# Patient Record
Sex: FEMALE | Race: WHITE | NOT HISPANIC OR LATINO | Employment: PART TIME | URBAN - METROPOLITAN AREA
[De-identification: names, ages, dates, MRNs, and addresses within clinical notes are randomized per-mention and may not be internally consistent; named-entity substitution may affect disease eponyms.]

---

## 2019-05-20 ENCOUNTER — HOSPITAL ENCOUNTER (OUTPATIENT)
Dept: OTHER | Facility: HOSPITAL | Age: 69
Discharge: HOME | End: 2019-05-20
Attending: FAMILY MEDICINE | Admitting: FAMILY MEDICINE

## 2022-02-02 ENCOUNTER — OFFICE VISIT (OUTPATIENT)
Dept: FAMILY MEDICINE CLINIC | Facility: CLINIC | Age: 72
End: 2022-02-02

## 2022-02-02 ENCOUNTER — TELEPHONE (OUTPATIENT)
Dept: FAMILY MEDICINE CLINIC | Facility: CLINIC | Age: 72
End: 2022-02-02

## 2022-02-02 VITALS
RESPIRATION RATE: 16 BRPM | HEART RATE: 82 BPM | WEIGHT: 119.8 LBS | OXYGEN SATURATION: 100 % | DIASTOLIC BLOOD PRESSURE: 70 MMHG | SYSTOLIC BLOOD PRESSURE: 128 MMHG | TEMPERATURE: 97.8 F | BODY MASS INDEX: 22.05 KG/M2 | HEIGHT: 62 IN

## 2022-02-02 DIAGNOSIS — Z53.20 SCREENING DECLINED BY PATIENT: ICD-10-CM

## 2022-02-02 DIAGNOSIS — Z12.11 COLON CANCER SCREENING: ICD-10-CM

## 2022-02-02 DIAGNOSIS — Z28.21 IMMUNIZATION DECLINED: ICD-10-CM

## 2022-02-02 DIAGNOSIS — Z12.4 SCREENING FOR CERVICAL CANCER: ICD-10-CM

## 2022-02-02 DIAGNOSIS — Z12.31 ENCOUNTER FOR SCREENING MAMMOGRAM FOR MALIGNANT NEOPLASM OF BREAST: ICD-10-CM

## 2022-02-02 DIAGNOSIS — Z00.00 MEDICARE ANNUAL WELLNESS VISIT, SUBSEQUENT: Primary | ICD-10-CM

## 2022-02-02 LAB
BILIRUB BLD-MCNC: NEGATIVE MG/DL
CLARITY, POC: CLEAR
COLOR UR: YELLOW
DEVELOPER EXPIRATION DATE: NORMAL
DEVELOPER LOT NUMBER: NORMAL
EXPIRATION DATE: NORMAL
FECAL OCCULT BLOOD SCREEN, POC: NEGATIVE
GLUCOSE UR STRIP-MCNC: NEGATIVE MG/DL
KETONES UR QL: NEGATIVE
LEUKOCYTE EST, POC: NEGATIVE
Lab: NORMAL
NEGATIVE CONTROL: NEGATIVE
NITRITE UR-MCNC: NEGATIVE MG/ML
PH UR: 8 [PH] (ref 5–8)
POSITIVE CONTROL: POSITIVE
PROT UR STRIP-MCNC: ABNORMAL MG/DL
RBC # UR STRIP: NEGATIVE /UL
SP GR UR: 1.01 (ref 1–1.03)
UROBILINOGEN UR QL: NORMAL

## 2022-02-02 PROCEDURE — 3015F CERV CANCER SCREEN DOCD: CPT | Performed by: FAMILY MEDICINE

## 2022-02-02 PROCEDURE — 1159F MED LIST DOCD IN RCRD: CPT | Performed by: FAMILY MEDICINE

## 2022-02-02 PROCEDURE — 99397 PER PM REEVAL EST PAT 65+ YR: CPT | Performed by: FAMILY MEDICINE

## 2022-02-02 PROCEDURE — G0439 PPPS, SUBSEQ VISIT: HCPCS | Performed by: FAMILY MEDICINE

## 2022-02-02 PROCEDURE — 81002 URINALYSIS NONAUTO W/O SCOPE: CPT | Performed by: FAMILY MEDICINE

## 2022-02-02 PROCEDURE — 82270 OCCULT BLOOD FECES: CPT | Performed by: FAMILY MEDICINE

## 2022-02-02 RX ORDER — MULTIPLE VITAMINS W/ MINERALS TAB 9MG-400MCG
1 TAB ORAL DAILY
COMMUNITY

## 2022-02-02 NOTE — PROGRESS NOTES
"The ABCs of the Annual Wellness Visit  Subsequent Medicare Wellness Visit    Chief Complaint   Patient presents with   • Medicare Wellness-subsequent      Subjective    History of Present Illness:  Lisbeth Villegas is a 71 y.o. female who presents for a Subsequent Medicare Wellness Visit.    The following portions of the patient's history were reviewed and updated as appropriate: allergies, current medications, past family history, past medical history, past social history, past surgical history and problem list.    Recent Hospitalizations:  She was not admitted to the hospital during the last year.     Current Medical Providers:  Patient Care Team:  Jumana Torres MD as PCP - General  Jumana Torres MD as PCP - Family Medicine    Outpatient Medications Prior to Visit   Medication Sig Dispense Refill   • multivitamin with minerals (CENTRUM SILVER 50+WOMEN PO) Take 1 tablet by mouth Daily.       No facility-administered medications prior to visit.       No opioid medication identified on active medication list. I have reviewed chart for other potential  high risk medication/s and harmful drug interactions in the elderly.        Aspirin is not on active medication list.  Aspirin use is not indicated based on review of current medical condition/s. Risk of harm outweighs potential benefits.  .    There is no problem list on file for this patient.    Advance Care Planning  Advance Directive is not on file.  ACP discussion was held with the patient during this visit. Patient does not have an advance directive, information provided.          Objective    Vitals:    02/02/22 1105   BP: 128/70   BP Location: Right arm   Patient Position: Sitting   Cuff Size: Adult   Pulse: 82   Resp: 16   Temp: 97.8 °F (36.6 °C)   TempSrc: Temporal   SpO2: 100%  Comment: Room air   Weight: 54.3 kg (119 lb 12.8 oz)   Height: 157.5 cm (62\")     BMI Readings from Last 1 Encounters:   02/02/22 21.91 kg/m²   BMI is below " normal parameters. Recommendations include: none (medical contraindication)  Body mass index is 21.91 kg/m².  BMI has not been calculated during today's encounter.     Does the patient have evidence of cognitive impairment? No    Physical Exam  Exam conducted with a chaperone present.   Constitutional:       General: She is not in acute distress.     Appearance: Normal appearance. She is well-developed. She is not ill-appearing.   HENT:      Head: Normocephalic and atraumatic.      Right Ear: Tympanic membrane, ear canal and external ear normal.      Left Ear: Tympanic membrane, ear canal and external ear normal.      Nose: Nose normal.      Mouth/Throat:      Mouth: Mucous membranes are moist.      Pharynx: No posterior oropharyngeal erythema.   Eyes:      General: No scleral icterus.        Right eye: No discharge.         Left eye: No discharge.      Extraocular Movements: Extraocular movements intact.      Conjunctiva/sclera: Conjunctivae normal.      Pupils: Pupils are equal, round, and reactive to light.   Neck:      Thyroid: No thyromegaly.      Vascular: No carotid bruit or JVD.   Cardiovascular:      Rate and Rhythm: Normal rate and regular rhythm.      Pulses: Normal pulses.      Heart sounds: Normal heart sounds. No murmur heard.      Pulmonary:      Effort: Pulmonary effort is normal.      Breath sounds: Normal breath sounds.   Chest:   Breasts: Breasts are symmetrical.      Right: No mass, nipple discharge, skin change or tenderness.      Left: No mass, nipple discharge, skin change or tenderness.       Abdominal:      General: Bowel sounds are normal.      Palpations: Abdomen is soft.      Tenderness: There is no abdominal tenderness. There is no guarding or rebound.   Genitourinary:     Exam position: Supine.      Labia:         Right: No rash.         Left: No rash.       Vagina: Normal. No vaginal discharge.      Cervix: No discharge, lesion or cervical bleeding.      Uterus: Not tender.        Adnexa:         Right: No mass or tenderness.          Left: No mass or tenderness.        Rectum: Normal. Guaiac result negative.   Musculoskeletal:      Cervical back: Normal range of motion and neck supple. No edema.      Right lower leg: No edema.      Left lower leg: No edema.   Lymphadenopathy:      Cervical: No cervical adenopathy.   Skin:     General: Skin is warm and dry.      Capillary Refill: Capillary refill takes less than 2 seconds.      Findings: No rash.   Neurological:      General: No focal deficit present.      Mental Status: She is alert and oriented to person, place, and time. Mental status is at baseline.      Cranial Nerves: No cranial nerve deficit.      Coordination: Coordination normal.      Gait: Gait normal.      Deep Tendon Reflexes: Reflexes normal.   Psychiatric:         Mood and Affect: Mood normal.         Behavior: Behavior normal.         Thought Content: Thought content normal.                 HEALTH RISK ASSESSMENT    Smoking Status:  Social History     Tobacco Use   Smoking Status Never Smoker   Smokeless Tobacco Never Used     Alcohol Consumption:  Social History     Substance and Sexual Activity   Alcohol Use Never     Fall Risk Screen:    formerly [facility] Fall Risk Assessment was completed, and patient is at LOW risk for falls.Assessment completed on:2/2/2022    Depression Screening:  PHQ-2/PHQ-9 Depression Screening 2/2/2022   Little interest or pleasure in doing things 0   Feeling down, depressed, or hopeless 0   Trouble falling or staying asleep, or sleeping too much 0   Feeling tired or having little energy 0   Poor appetite or overeating 0   Feeling bad about yourself - or that you are a failure or have let yourself or your family down 0   Trouble concentrating on things, such as reading the newspaper or watching television 0   Moving or speaking so slowly that other people could have noticed. Or the opposite - being so fidgety or restless that you have been moving around a lot  more than usual 0   Thoughts that you would be better off dead, or of hurting yourself in some way 0   Total Score 0   If you checked off any problems, how difficult have these problems made it for you to do your work, take care of things at home, or get along with other people? Not difficult at all       Health Habits and Functional and Cognitive Screening:  Functional & Cognitive Status 2/2/2022   Do you have difficulty preparing food and eating? No   Do you have difficulty bathing yourself, getting dressed or grooming yourself? No   Do you have difficulty using the toilet? No   Do you have difficulty moving around from place to place? No   Do you have trouble with steps or getting out of a bed or a chair? No   Current Diet Well Balanced Diet   Dental Exam Not up to date        Dental Exam Comment 2-3 years ago   Eye Exam Not up to date        Eye Exam Comment 4 years ago   Exercise (times per week) 0 times per week   Current Exercises Include No Regular Exercise   Do you need help using the phone?  No   Are you deaf or do you have serious difficulty hearing?  No   Do you need help with transportation? No   Do you need help shopping? No   Do you need help preparing meals?  No   Do you need help with housework?  No   Do you need help with laundry? No   Do you need help taking your medications? No   Do you need help managing money? No   Do you ever drive or ride in a car without wearing a seat belt? No   Have you felt unusual stress, anger or loneliness in the last month? No   Who do you live with? Spouse   If you need help, do you have trouble finding someone available to you? No   Have you been bothered in the last four weeks by sexual problems? No   Do you have difficulty concentrating, remembering or making decisions? No       Age-appropriate Screening Schedule:  Refer to the list below for future screening recommendations based on patient's age, sex and/or medical conditions. Orders for these recommended tests  are listed in the plan section. The patient has been provided with a written plan.    Health Maintenance   Topic Date Due   • INFLUENZA VACCINE  03/31/2022 (Originally 8/1/2021)   • DXA SCAN  02/02/2023 (Originally 1950)   • TDAP/TD VACCINES (1 - Tdap) 02/02/2023 (Originally 4/9/1969)   • ZOSTER VACCINE (1 of 2) 02/02/2023 (Originally 4/9/2001)   • MAMMOGRAM  02/09/2024                POCT urinalysis dipstick, manual    Collection Time: 02/02/22 11:21 AM    Specimen: Urine   Result Value Ref Range    Color Yellow Yellow, Straw, Dark Yellow, [place]    Clarity, UA Clear Clear    Glucose, UA Negative Negative, 1000 mg/dL (3+) mg/dL    Bilirubin Negative Negative    Ketones, UA Negative Negative    Specific Gravity  1.010 1.005 - 1.030    Blood, UA Negative Negative    pH, Urine 8.0 5.0 - 8.0    Protein, POC Trace (A) Negative mg/dL    Urobilinogen, UA Normal Normal    Leukocytes Negative Negative    Nitrite, UA Negative Negative   POC Occult Blood Stool    Collection Time: 02/02/22 12:00 PM    Specimen: Stool   Result Value Ref Range    Fecal Occult Blood Negative Negative         Assessment/Plan   CMS Preventative Services Quick Reference  Risk Factors Identified During Encounter  Cardiovascular Disease - declines screening  Chronic Pain - no concerns today  Dementia/Memory - screening negative  Depression/Dysphoria- screening negative  Hearing Problem - no concerns today  Immunizations Discussed/Encouraged (specific Immunizations; many vaccinations due - patient declines vaccination)  Inadequate Social Support, Isolation, Loneliness, Lack of Transportation, Financial Difficulties, or Caregiver Stress - no issues brought up by patient today.    The above risks/problems have been discussed with the patient.  Follow up actions/plans if indicated are seen below in the Assessment/Plan Section.  Pertinent information has been shared with the patient in the After Visit Summary.    Diagnoses and all orders for this  visit:    1. Medicare annual wellness visit, subsequent (Primary)  -     POCT urinalysis dipstick, manual    2. Screening for cervical cancer  -     IGP,Aptima HPV,Age Gdln  -     Pap IG (Image Guided)    3. Encounter for screening mammogram for malignant neoplasm of breast  -     Mammo Screening Digital Tomosynthesis Bilateral With CAD    4. Colon cancer screening  -     POC Occult Blood Stool    5. Immunization declined  Comments:  Multiple.    6. Screening declined by patient  Comments:  Colon cancer screening - hemoccult done as part of exam today and negative.  Declines hep C and lipid screening exams.    Patient only desiring pap smear and mammogram.  Declines other preventative services.    Follow Up:   Return if symptoms worsen or fail to improve.     An After Visit Summary and PPPS were made available to the patient.         I wore protective equipment throughout this patient encounter to include mask. Hand hygiene was performed before donning protective equipment and after removal when leaving the room.

## 2022-02-04 LAB
AGE GDLN ACOG TESTING: NORMAL
CYTOLOGIST CVX/VAG CYTO: NORMAL
CYTOLOGY CVX/VAG DOC CYTO: NORMAL
CYTOLOGY CVX/VAG DOC THIN PREP: NORMAL
DX ICD CODE: NORMAL
HIV 1 & 2 AB SER-IMP: NORMAL
OTHER STN SPEC: NORMAL
STAT OF ADQ CVX/VAG CYTO-IMP: NORMAL

## 2022-02-09 ENCOUNTER — HOSPITAL ENCOUNTER (OUTPATIENT)
Dept: MAMMOGRAPHY | Facility: HOSPITAL | Age: 72
Discharge: HOME | End: 2022-02-09
Admitting: FAMILY MEDICINE

## 2022-02-09 PROCEDURE — 77067 SCR MAMMO BI INCL CAD: CPT

## 2022-02-09 PROCEDURE — 77063 BREAST TOMOSYNTHESIS BI: CPT

## 2023-07-31 ENCOUNTER — OFFICE VISIT (OUTPATIENT)
Dept: FAMILY MEDICINE CLINIC | Facility: CLINIC | Age: 73
End: 2023-07-31
Payer: MEDICARE

## 2023-07-31 VITALS
HEART RATE: 82 BPM | OXYGEN SATURATION: 100 % | SYSTOLIC BLOOD PRESSURE: 126 MMHG | DIASTOLIC BLOOD PRESSURE: 86 MMHG | WEIGHT: 115 LBS | HEIGHT: 62 IN | RESPIRATION RATE: 20 BRPM | BODY MASS INDEX: 21.16 KG/M2

## 2023-07-31 DIAGNOSIS — Z13.220 SCREENING FOR LIPID DISORDERS: ICD-10-CM

## 2023-07-31 DIAGNOSIS — Z00.00 MEDICARE ANNUAL WELLNESS VISIT, SUBSEQUENT: Primary | ICD-10-CM

## 2023-07-31 DIAGNOSIS — N95.9 POST MENOPAUSAL PROBLEMS: ICD-10-CM

## 2023-07-31 DIAGNOSIS — E78.5 HYPERLIPIDEMIA, UNSPECIFIED HYPERLIPIDEMIA TYPE: ICD-10-CM

## 2023-07-31 DIAGNOSIS — Z12.31 ENCOUNTER FOR SCREENING MAMMOGRAM FOR MALIGNANT NEOPLASM OF BREAST: ICD-10-CM

## 2023-07-31 DIAGNOSIS — R53.83 MALAISE AND FATIGUE: ICD-10-CM

## 2023-07-31 DIAGNOSIS — R53.81 MALAISE AND FATIGUE: ICD-10-CM

## 2023-07-31 NOTE — PROGRESS NOTES
Subjective   Lisbeth Villegas is a 73 y.o. female.     Chief Complaint   Patient presents with    Medicare Wellness-subsequent       The patient is here: for coordination of medical care to discuss health maintenance and disease prevention.  Last comprehensive physical was on 2/2/2022.  Previous physical was performed by  Shane Younger MD  Overall has: vigorous activity with work/home activities, exercises 2 - 3 times per week, good appetite, feels well with no complaints, good energy level, and is sleeping well. Nutrition: appropriate balanced diet and eating a variety of foods. Last tetanus shot was unknown.   Last Completed Mammogram            MAMMOGRAM (Every 2 Years) Next due on 2/9/2024 02/09/2022  Mammo Screening Digital Tomosynthesis Bilateral With CAD    12/16/2014  SCANNED - MAMMO    08/24/2012  SCANNED - MAMMO    07/30/2010  SCANNED - MAMMO    08/22/2008  SCANNED - MAMMO                    History of Present Illness     Recent Hospitalizations:  No hospitalization(s) within the last year..  ccc    I personally reviewed and updated the patient's allergies, medications, problem list, and past medical, surgical, social, and family history. I have reviewed and confirmed the accuracy of the HPI and ROS as documented by the MA/LPN/RN Sofia Lewis MA      Family History   Problem Relation Age of Onset    Stroke Mother 80    Stroke Father 80    Heart attack Sister     Prostate cancer Brother        Social History     Tobacco Use    Smoking status: Never     Passive exposure: Never    Smokeless tobacco: Never   Vaping Use    Vaping Use: Never used   Substance Use Topics    Alcohol use: Never    Drug use: Never       Past Surgical History:   Procedure Laterality Date    CATARACT EXTRACTION, BILATERAL Bilateral 11/01/2022    Dr. Russell    KNEE ARTHROSCOPY Left 01/01/2017    LAPAROSCOPIC TUBAL LIGATION         Patient Active Problem List   Diagnosis    Medicare annual wellness visit, subsequent    Encounter for  "screening mammogram for malignant neoplasm of breast    Post menopausal problems         Current Outpatient Medications:     multivitamin with minerals tablet tablet, Take 1 tablet by mouth Daily. (Patient not taking: Reported on 2023), Disp: , Rfl:       Objective   /86 (BP Location: Right arm, Patient Position: Sitting, Cuff Size: Adult)   Pulse 82   Resp 20   Ht 157.5 cm (62.01\")   Wt 52.2 kg (115 lb)   SpO2 100%   BMI 21.03 kg/mý   BP Readings from Last 3 Encounters:   23 126/86   22 128/70   19 126/89     Wt Readings from Last 3 Encounters:   23 52.2 kg (115 lb)   22 54.3 kg (119 lb 12.8 oz)   19 50.9 kg (112 lb 4 oz)         Age-appropriate Screening Schedule:  Refer to the list below for future screening recommendations based on patient's age, sex and/or medical conditions. Orders for these Rosa Damien tests are listed in the plan section. The patient has been provided with a written plan.    Health Maintenance   Topic Date Due    DXA SCAN  Never done    COVID-19 Vaccine (1) Never done    TDAP/TD VACCINES (1 - Tdap) Never done    ZOSTER VACCINE (1 of 2) Never done    Pneumococcal Vaccine 65+ (1 - PCV) Never done    HEPATITIS C SCREENING  Never done    COLORECTAL CANCER SCREENING  2023    INFLUENZA VACCINE  10/01/2023    MAMMOGRAM  2024    ANNUAL WELLNESS VISIT  2024       Depression Screen:       2023     1:07 PM   PHQ-2/PHQ-9 Depression Screening   Little Interest or Pleasure in Doing Things 0-->not at all   Feeling Down, Depressed or Hopeless 0-->not at all   PHQ-9: Brief Depression Severity Measure Score 0     I spent more than 16 minutes asking patient questions, counseling and documenting in the chart.    Health Habits and Functional and Cognitive Screenin/31/2023     1:07 PM   Functional & Cognitive Status   Do you have difficulty preparing food and eating? No   Do you have difficulty bathing yourself, getting dressed " or grooming yourself? No   Do you have difficulty using the toilet? No   Do you have difficulty moving around from place to place? No   Do you have trouble with steps or getting out of a bed or a chair? No   Current Diet Well Balanced Diet   Dental Exam Up to date   Eye Exam Up to date   Exercise (times per week) 8 times per week   Current Exercises Include Yard Work;Walking   Do you need help using the phone?  No   Are you deaf or do you have serious difficulty hearing?  No   Do you need help to go to places out of walking distance? No   Do you need help shopping? No   Do you need help preparing meals?  No   Do you need help with housework?  No   Do you need help with laundry? No   Do you need help taking your medications? No   Do you need help managing money? No   Do you ever drive or ride in a car without wearing a seat belt? No   Have you felt unusual stress, anger or loneliness in the last month? No   Who do you live with? Spouse   If you need help, do you have trouble finding someone available to you? No   Have you been bothered in the last four weeks by sexual problems? No   Do you have difficulty concentrating, remembering or making decisions? No       Does the patient have evidence of cognitive impairment? No    Advance Care Planning:  ACP discussion was held with the patient during this visit. Patient does not have an advance directive, declines further assistance.     A face-to-face visit was completed today with patient.  Counseling explanation, and discussion of advanced directives was performed.   The last advanced care visit was performed in 2022.  In a near life ending situation, from which she is not expected to recover functionally, and she is not able to speak for her, she does not want life sustaining measures. We discussed feeding tubes, ventilators and cardiac support as well as dialysis.    I spent more than 16 minutes discussing Advanced Care Planning information and documenting in the  chart.    Identification of Risk Factors:  Risk factors include: Advance Directive Discussion  Breast Cancer/Mammogram Screening  Cardiovascular risk  Colon Cancer Screening  Fall Risk  Immunizations Discussed/Encouraged (specific immunizations; Tdap, Prevnar 20 (Pneumococcal 20-valent conjugate), Shingrix, and COVID19 )  Osteoporosis Risk.    Compared to one year ago, the patient feels her physical health is the same.  Compared to one year ago, the patient feels her mental health is the same.    Patient Self-Management and Personalized Health Advice  The patient has been provided with information about: diet, exercise, weight management, prevention of cardiac or vascular disease, fall prevention, and designing advance directives and preventive services including:   Alcohol Misuse Screening and Counseling  (15 minutes counseling time, Code )  Annual Wellness Visit (AWV)  Bone Density Measurements  Cardiovascular Disease Screening Tests (may do this order every 5 years in beneficiaries without signs or symptoms of cardiovascular disease)  Colorectal Cancer Screening, Colonoscopy.      Assessment & Plan      Medications        Problem List         LOS    Medicare wellness.  Overall doing well.  Recommend update pneumonia, tetanus vaccine she declines.  Discussed calcium vitamin D, coated baby aspirin daily.  Check screening blood work.  Discussed health maintenance, screening test, lifestyle modification.  DEXA scan declined.  Breast cancer screening.  Recommend mammogram she declines.  Family history CVA.  Both parents.  Start coated baby aspirin daily.  Recommend carotid Dopplers times.  Colon cancer screening.  Recommend Cologuard/colonoscopy she declines.      Diagnoses and all orders for this visit:    1. Medicare annual wellness visit, subsequent (Primary)    2. Encounter for screening mammogram for malignant neoplasm of breast    3. Post menopausal problems        Medicare wellness.  Discussed health  maintenance, routine immunizations, screening tests, lifestyle modification.

## 2023-08-01 LAB
ALBUMIN SERPL-MCNC: 4.8 G/DL (ref 3.8–4.8)
ALBUMIN/GLOB SERPL: 1.7 {RATIO} (ref 1.2–2.2)
ALP SERPL-CCNC: 74 IU/L (ref 44–121)
ALT SERPL-CCNC: 16 IU/L (ref 0–32)
AST SERPL-CCNC: 33 IU/L (ref 0–40)
BASOPHILS # BLD AUTO: 0 X10E3/UL (ref 0–0.2)
BASOPHILS NFR BLD AUTO: 0 %
BILIRUB SERPL-MCNC: 1 MG/DL (ref 0–1.2)
BUN SERPL-MCNC: 17 MG/DL (ref 8–27)
BUN/CREAT SERPL: 20 (ref 12–28)
CALCIUM SERPL-MCNC: 9.5 MG/DL (ref 8.7–10.3)
CHLORIDE SERPL-SCNC: 103 MMOL/L (ref 96–106)
CHOLEST SERPL-MCNC: 255 MG/DL (ref 100–199)
CHOLEST/HDLC SERPL: 2.9 RATIO (ref 0–4.4)
CO2 SERPL-SCNC: 21 MMOL/L (ref 20–29)
CREAT SERPL-MCNC: 0.85 MG/DL (ref 0.57–1)
EGFRCR SERPLBLD CKD-EPI 2021: 73 ML/MIN/1.73
EOSINOPHIL # BLD AUTO: 0 X10E3/UL (ref 0–0.4)
EOSINOPHIL NFR BLD AUTO: 1 %
ERYTHROCYTE [DISTWIDTH] IN BLOOD BY AUTOMATED COUNT: 13.9 % (ref 11.7–15.4)
GLOBULIN SER CALC-MCNC: 2.8 G/DL (ref 1.5–4.5)
GLUCOSE SERPL-MCNC: 83 MG/DL (ref 70–99)
HCT VFR BLD AUTO: 42.5 % (ref 34–46.6)
HDLC SERPL-MCNC: 87 MG/DL
HGB BLD-MCNC: 14.2 G/DL (ref 11.1–15.9)
IMM GRANULOCYTES # BLD AUTO: 0 X10E3/UL (ref 0–0.1)
IMM GRANULOCYTES NFR BLD AUTO: 0 %
LDLC SERPL CALC-MCNC: 150 MG/DL (ref 0–99)
LYMPHOCYTES # BLD AUTO: 2.2 X10E3/UL (ref 0.7–3.1)
LYMPHOCYTES NFR BLD AUTO: 36 %
MCH RBC QN AUTO: 30.3 PG (ref 26.6–33)
MCHC RBC AUTO-ENTMCNC: 33.4 G/DL (ref 31.5–35.7)
MCV RBC AUTO: 91 FL (ref 79–97)
MONOCYTES # BLD AUTO: 0.3 X10E3/UL (ref 0.1–0.9)
MONOCYTES NFR BLD AUTO: 6 %
NEUTROPHILS # BLD AUTO: 3.5 X10E3/UL (ref 1.4–7)
NEUTROPHILS NFR BLD AUTO: 57 %
PLATELET # BLD AUTO: 246 X10E3/UL (ref 150–450)
POTASSIUM SERPL-SCNC: 5 MMOL/L (ref 3.5–5.2)
PROT SERPL-MCNC: 7.6 G/DL (ref 6–8.5)
RBC # BLD AUTO: 4.69 X10E6/UL (ref 3.77–5.28)
SODIUM SERPL-SCNC: 142 MMOL/L (ref 134–144)
TRIGL SERPL-MCNC: 107 MG/DL (ref 0–149)
TSH SERPL DL<=0.005 MIU/L-ACNC: 7.65 UIU/ML (ref 0.45–4.5)
VLDLC SERPL CALC-MCNC: 18 MG/DL (ref 5–40)
WBC # BLD AUTO: 6 X10E3/UL (ref 3.4–10.8)

## 2023-08-25 PROBLEM — E78.5 HYPERLIPIDEMIA: Status: ACTIVE | Noted: 2023-08-25

## 2023-08-25 NOTE — PROGRESS NOTES
Subjective   Lisbeth Villegas is a 73 y.o. female.     Chief Complaint   Patient presents with    Medicare Wellness-subsequent    Hyperlipidemia       Hyperlipidemia  This is a chronic problem. The current episode started more than 1 year ago. Pertinent negatives include no chest pain, leg pain or shortness of breath. Current antihyperlipidemic treatment includes diet change and exercise. Risk factors for coronary artery disease include dyslipidemia.          I personally reviewed and updated the patient's allergies, medications, problem list, and past medical, surgical, social, and family history. I have reviewed and confirmed the accuracy of the History of Present Illness and Review of Symptoms as documented by the MA/LPN/RN. Shane Younger MD    Family History   Problem Relation Age of Onset    Stroke Mother 80    Stroke Father 80    Heart attack Sister     Prostate cancer Brother        Social History     Tobacco Use    Smoking status: Never     Passive exposure: Never    Smokeless tobacco: Never   Vaping Use    Vaping Use: Never used   Substance Use Topics    Alcohol use: Never    Drug use: Never       Past Surgical History:   Procedure Laterality Date    CATARACT EXTRACTION, BILATERAL Bilateral 11/01/2022    Dr. Russell    KNEE ARTHROSCOPY Left 01/01/2017    LAPAROSCOPIC TUBAL LIGATION         Patient Active Problem List   Diagnosis    Medicare annual wellness visit, subsequent    Encounter for screening mammogram for malignant neoplasm of breast    Post menopausal problems    Hyperlipidemia         Current Outpatient Medications:     multivitamin with minerals tablet tablet, Take 1 tablet by mouth Daily. (Patient not taking: Reported on 7/31/2023), Disp: , Rfl:          Review of Systems   Constitutional:  Negative for chills and diaphoresis.   HENT:  Negative for trouble swallowing and voice change.    Eyes:  Negative for visual disturbance.   Respiratory:  Negative for shortness of breath.   "  Cardiovascular:  Negative for chest pain and palpitations.   Gastrointestinal:  Negative for abdominal pain and nausea.   Endocrine: Negative for polydipsia and polyphagia.   Genitourinary:  Negative for hematuria.   Musculoskeletal:  Negative for neck stiffness.   Skin:  Negative for color change and pallor.   Allergic/Immunologic: Negative for immunocompromised state.   Neurological:  Negative for seizures and syncope.   Hematological:  Negative for adenopathy.   Psychiatric/Behavioral:  Negative for hallucinations, sleep disturbance and suicidal ideas.      I have reviewed and confirmed the accuracy of the ROS as documented by the MA/LPN/RN Shane Younger MD      Objective   /86 (BP Location: Right arm, Patient Position: Sitting, Cuff Size: Adult)   Pulse 82   Resp 20   Ht 157.5 cm (62.01\")   Wt 52.2 kg (115 lb)   SpO2 100%   BMI 21.03 kg/mý   BP Readings from Last 3 Encounters:   07/31/23 126/86   02/02/22 128/70   05/20/19 126/89     Wt Readings from Last 3 Encounters:   07/31/23 52.2 kg (115 lb)   02/02/22 54.3 kg (119 lb 12.8 oz)   05/20/19 50.9 kg (112 lb 4 oz)     Physical Exam  Constitutional:       Appearance: She is well-developed. She is not diaphoretic.   HENT:      Head: Normocephalic.      Right Ear: Tympanic membrane, ear canal and external ear normal.      Left Ear: Tympanic membrane, ear canal and external ear normal.      Nose: Nose normal.   Eyes:      General: Lids are normal.      Conjunctiva/sclera: Conjunctivae normal.      Pupils: Pupils are equal, round, and reactive to light.   Neck:      Thyroid: No thyromegaly.      Vascular: No carotid bruit or JVD.      Trachea: No tracheal deviation.   Cardiovascular:      Rate and Rhythm: Normal rate and regular rhythm.      Heart sounds: Normal heart sounds. No murmur heard.    No friction rub. No gallop.   Pulmonary:      Effort: Pulmonary effort is normal.      Breath sounds: Normal breath sounds. No stridor. No decreased breath " sounds, wheezing or rales.   Abdominal:      General: Bowel sounds are normal. There is no distension.      Palpations: Abdomen is soft. There is no mass.      Tenderness: There is no abdominal tenderness. There is no guarding or rebound.      Hernia: No hernia is present.   Lymphadenopathy:      Head:      Right side of head: No submental, submandibular, tonsillar, preauricular, posterior auricular or occipital adenopathy.      Left side of head: No submental, submandibular, tonsillar, preauricular, posterior auricular or occipital adenopathy.      Cervical: No cervical adenopathy.   Skin:     General: Skin is warm and dry.      Coloration: Skin is not pale.   Neurological:      Mental Status: She is alert and oriented to person, place, and time.      Cranial Nerves: No cranial nerve deficit.      Sensory: No sensory deficit.      Coordination: Coordination normal.      Gait: Gait normal.      Deep Tendon Reflexes: Reflexes are normal and symmetric.     Data / Lab Results:    No results found for: HGBA1C     Lab Results   Component Value Date     (H) 07/31/2023     No results found for: CHOL  Lab Results   Component Value Date    TRIG 107 07/31/2023     Lab Results   Component Value Date    HDL 87 07/31/2023     No results found for: PSA  Lab Results   Component Value Date    WBC 6.0 07/31/2023    HGB 14.2 07/31/2023    HCT 42.5 07/31/2023    MCV 91 07/31/2023     07/31/2023     Lab Results   Component Value Date    TSH 7.650 (H) 07/31/2023      Lab Results   Component Value Date    GLUCOSE 83 07/31/2023    BUN 17 07/31/2023    CREATININE 0.85 07/31/2023    BCR 20 07/31/2023    K 5.0 07/31/2023    CO2 21 07/31/2023    CALCIUM 9.5 07/31/2023    PROTENTOTREF 7.6 07/31/2023    ALBUMIN 4.8 07/31/2023    LABIL2 1.7 07/31/2023    AST 33 07/31/2023    ALT 16 07/31/2023     No results found for: LIZANDRO, RF, SEDRATE   No results found for: CRP   No results found for: IRON, TIBC, FERRITIN   No results found for:  [id number]       Assessment & Plan      Medications        Problem List         LOS    Mixed hyperlipidemia.  History of per her report.  Check fasting labs.  Discussed diet, exercise, lifestyle modification.  Follow-up recheck.  Consider Rx if persistent elevation      Diagnoses and all orders for this visit:    1. Medicare annual wellness visit, subsequent (Primary)  -     CBC & Differential  -     Comprehensive Metabolic Panel  -     Lipid Panel With / Chol / HDL Ratio  -     TSH  -     CBC & Differential; Future  -     Comprehensive Metabolic Panel; Future  -     Lipid Panel With / Chol / HDL Ratio; Future  -     TSH; Future    2. Hyperlipidemia, unspecified hyperlipidemia type  -     Lipid Panel With / Chol / HDL Ratio; Future    3. Encounter for screening mammogram for malignant neoplasm of breast    4. Post menopausal problems    5. Malaise and fatigue  -     CBC & Differential; Future  -     Comprehensive Metabolic Panel; Future  -     TSH; Future    6. Screening for lipid disorders  -     Lipid Panel With / Chol / HDL Ratio              Expected course, medications, and adverse effects discussed.  Call or return if worsening or persistent symptoms.  I wore protective equipment throughout this patient encounter including a mask, gloves, and eye protection.  Hand hygiene was performed before donning protective equipment and after removal when leaving the room. The complete contents of the Assessment and Plan and Data/Lab Results as documented above have been reviewed and addressed by myself with the patient today as part of an ongoing evaluation / treatment plan.  If some of the documentation has been copied from a previous note and is unchanged it indicates that this problem / plan has been assessed today but is stable from a previous visit and no changes have been recommended.

## 2023-12-04 ENCOUNTER — TELEPHONE (OUTPATIENT)
Dept: FAMILY MEDICINE CLINIC | Facility: CLINIC | Age: 73
End: 2023-12-04

## 2023-12-04 DIAGNOSIS — Z12.31 ENCOUNTER FOR SCREENING MAMMOGRAM FOR MALIGNANT NEOPLASM OF BREAST: Primary | ICD-10-CM

## 2023-12-04 DIAGNOSIS — N95.9 POST MENOPAUSAL PROBLEMS: ICD-10-CM

## 2023-12-04 NOTE — TELEPHONE ENCOUNTER
Caller: Lisbeth Villegas    Relationship: Self    Best call back number: [phone]     What orders are you requesting (i.e. lab or imaging): BONE DENSITY, MAMMOGRAM     Additional notes: PATIENT IS REQUESTING ORDERS FOR A BONE DENSITY TEST AND A MAMMOGRAM AND WOULD LIKE A CALL BACK TO GET SCHEDULED. PLEASE ADVISE.

## 2024-01-24 ENCOUNTER — TELEPHONE (OUTPATIENT)
Dept: FAMILY MEDICINE CLINIC | Facility: CLINIC | Age: 74
End: 2024-01-24
Payer: MEDICARE

## 2024-01-24 NOTE — TELEPHONE ENCOUNTER
That is okay to get her set up for colonoscopy with me at [facility], thanks

## 2024-03-01 ENCOUNTER — TELEPHONE (OUTPATIENT)
Dept: FAMILY MEDICINE CLINIC | Facility: CLINIC | Age: 74
End: 2024-03-01

## 2024-03-01 ENCOUNTER — OUTSIDE FACILITY SERVICE (OUTPATIENT)
Dept: FAMILY MEDICINE CLINIC | Facility: CLINIC | Age: 74
End: 2024-03-01
Payer: MEDICARE

## 2024-03-01 NOTE — PROGRESS NOTES
PREOPERATIVE DIAGNOSES:   [Z12.11] Colon screening.   [K59.90] Diverticulosis.     POSTOPERATIVE DIAGNOSES:  [Z12.11] Colon screening.   [K59.90] Diverticulosis.    PROCEDURE PERFORMED: Colonoscopy beyond splenic flexure with biopsy [[id number]].    SURGEON: Shane Younger MD    INDICATIONS: This is a 73-year-old white female who presents for a screening colonoscopy. She has not had any blood in her stool or change in her bowel habits. She did undergo a FIT test which was positive.     FINDINGS:   Normal colonic mucosa to the cecum.   Moderate pancolonic diverticulosis.   Some sclerosed internal hemorrhoids.   Some small erosions were found at the 100 and the 60 cm libia, these were biopsied.     DESCRIPTION OF PROCEDURE: After obtaining informed consent and discussing the risks, benefits, and alternatives, the patient was taken to the endoscopy suite and propofol was administered per Anesthesia. After normal rectal exam, the colonoscope was inserted through the rectum, sigmoid, ascending, transverse and descending colon to the cecum. The ileocecal valve was visualized, no pericecal abnormalities were noted. The scope was withdrawn slowly using insufflation, desufflation and careful circumferential evaluation of the mucosa throughout. At the 100 cm libia and the 60 cm libia some small erosions were discovered, these were not inflamed, both spots were biopsied and samples sent for pathology. A moderate amount of diverticulosis was noted throughout the colon. Retroflexion was performed, no rectal mass were seen, some sclerosed internal hemorrhoids were seen. The scope was then removed and the patient was taken to the recovery room in stable condition.     RECOMMENDATIONS:   Patient did undergo a relatively benign colonoscopy today, negative for polyps.   Moderate pancolonic diverticulosis. She is instructed to increase her fiber intake.   Sclerosed internal hemorrhoids, as a possible source of her positive FIT test. She is  recommended to increase her fiber intake.   Colon erosions. Further recommendations will follow pathology results.

## 2024-03-01 NOTE — TELEPHONE ENCOUNTER
Check on Lisbeth and make sure she is recovering okay from her colonoscopy, everything looked good, no sign of polyps or anything suspicious, she did have some diverticula and some internal hemorrhoids want her to increase her fiber intake, these could have been the source of the bleeding and her positive fit test, she had a couple areas where she had some small erosions or ulcers in her colon those were biopsied so she we will call her again with biopsy results, thanks

## 2024-04-19 ENCOUNTER — READMISSION MANAGEMENT (OUTPATIENT)
Dept: CALL CENTER | Facility: HOSPITAL | Age: 74
End: 2024-04-19
Payer: MEDICARE

## 2024-04-19 NOTE — OUTREACH NOTE
Prep Survey      Flowsheet Row Responses   [facility] facility patient discharged from? Non-BH   Is LACE score < 7 ? Non-BH Discharge   Eligibility [facility]   Date of Admission 04/14/24   Date of Discharge 04/18/24   Discharge Disposition Home or Self Care   Discharge diagnosis Gallstone pancreatitis (Primary Dx),  Other acute pancreatitis, lap choley   Does the patient have one of the following disease processes/diagnoses(primary or secondary)? General Surgery   Prep survey completed? Yes            Tigist AGOSTO - Registered Nurse

## 2024-04-22 ENCOUNTER — TRANSITIONAL CARE MANAGEMENT TELEPHONE ENCOUNTER (OUTPATIENT)
Dept: CALL CENTER | Facility: HOSPITAL | Age: 74
End: 2024-04-22
Payer: MEDICARE

## 2024-04-22 NOTE — OUTREACH NOTE
Call Center TCM Note      Flowsheet Row Responses   [facility] patient discharged from? Non-BH   Does the patient have one of the following disease processes/diagnoses(primary or secondary)? Other   TCM attempt successful? No   Unsuccessful attempts Attempt 1            Jane Tucker MA    4/22/2024, 15:45 EDT

## 2024-04-22 NOTE — OUTREACH NOTE
Call Center TCM Note      Flowsheet Row Responses   [facility] patient discharged from? Non-  [[place]]   Does the patient have one of the following disease processes/diagnoses(primary or secondary)? Other   TCM attempt successful? Yes   Call end time 1647   Discharge diagnosis Gallstone pancreatitis (Primary Dx),  Other acute pancreatitis, lap choley   Is the patient taking all medications as directed (includes completed medication regime)? Yes   Medication comments No changes   Comments  states he wants her to make appt.  He will tell her to make appt.   Does the patient have an appointment with their PCP within 7-14 days of discharge? No   Nursing Interventions Patient declined scheduling/rescheduling appointment at this time   Psychosocial issues? No   Did the patient receive a copy of their discharge instructions? Yes   Nursing interventions Reviewed instructions with patient   What is the patient's perception of their health status since discharge? Improving   Is the patient/caregiver able to teach back signs and symptoms related to disease process for when to call PCP? Yes   Is the patient/caregiver able to teach back signs and symptoms related to disease process for when to call 911? Yes   Is the patient/caregiver able to teach back the hierarchy of who to call/visit for symptoms/problems? PCP, Specialist, Home health nurse, Urgent Care, ED, 911 Yes   Additional teach back comments Call was brief with  and states she is doing well and she is back to work.   TCM call completed? Yes   Wrap up additional comments Pt to make appt with PCP   Call end time 1647            Halle Monroe LPN    4/22/2024, 16:48 EDT

## 2024-04-23 ENCOUNTER — TELEPHONE (OUTPATIENT)
Dept: FAMILY MEDICINE CLINIC | Facility: CLINIC | Age: 74
End: 2024-04-23
Payer: MEDICARE

## 2024-04-23 NOTE — TELEPHONE ENCOUNTER
[facility] Left patient VM to return my call to schedule this with Dr Younger. Patient was seen at [facility] 4/14/2024 to 4/18/2024 for Acute Pancreatitis.

## 2024-04-23 NOTE — PROGRESS NOTES
I attempted to call [facility]. Left patient VM to return my call to schedule this with Dr Younger. Patient was seen at [facility] 4/14/2024 to 4/18/2024 for Acute Pancreatitis.

## 2024-04-24 ENCOUNTER — TELEPHONE (OUTPATIENT)
Dept: FAMILY MEDICINE CLINIC | Facility: CLINIC | Age: 74
End: 2024-04-24
Payer: MEDICARE

## 2024-04-24 NOTE — TELEPHONE ENCOUNTER
Caller: Lisbeth Villegas    Relationship: Self    Best call back number: [phone]     What is the best time to reach you: MORNING BEFORE 12PM AND THIS AFTERNOON 4-5PM     Who are you requesting to speak with (clinical staff, provider,  specific staff member): CLINICAL     What was the call regarding: PATIENT RETURNING CALL TO GET HER HOSPITAL FOLLOW UP  SCHEDULED. MORNINGS WORK BEST AND ANYTIME ON WEDNESDAYS. PATIENT ALSO STATED THAT SHE WOULD LIKE APPOINTMENT TO BE SCHEDULED AND JUST CALLED WITH A DAY AND TIME.

## 2024-04-24 NOTE — TELEPHONE ENCOUNTER
Patient returned phone call to schedule. She stated that she is available in the mornings or Wednesdays. Please call back when available, thanks!

## 2024-04-24 NOTE — PROGRESS NOTES
LM for patient to return call. Hub can transfer into office for adequate TCM followup due to limited schedule availability.

## 2024-04-30 NOTE — PROGRESS NOTES
Subjective   Lisbeth Villegas is a 74 y.o. female.     Chief Complaint   Patient presents with    Hospital Follow Up Visit     [place]    Acute Pancreatitis       History of Present Illness  Lisbeth was seen at [facility] on 4/13/2024. She was seen for severe abdominal pain.     Labs that were performed during the encounter included: CMP-decreased sodium 135, elevated BUN 32.8, elevated lipase 1277, elevated bili. Otherwise WNL, CBC- showed elevated WBC 14.1, Granulocyte elevated 82.2, lymphocyte frederick 12.0. PT WNL, INR 1.1, and PTT WNL.    Diagnostic studies that were performed included: CT Scan of abdomen showed acute interstitial pancreatitis without evidence of pancreatic necrosis or other complication.reactive inflammation noted at the splenic flexure of the colon and reactive small bowel ileus without obstruction. Small volume scattered ascites. Thickening of the bile duct walls which could be related to inflammation. She was given IV Pain meds and transferred to [facility] for further evaluation and treatment.     Lisbeth was then transferred to [facility] and admitted for gallstone pancreatitis and had laparoscopic cholecystectomy by Dr Lauren. She was kept for observation for management of pain and nausea control.     Lisbeth was seen at [facility]. She was admitted on 4/14/2024  for Abdominal Pain. She was discharged on 4/18/2024. Discharge diagnosis was Acute Pancreatitis.   Procedure: Laparoscopic Cholecystectomy with Dr Rodo Lauren.       Currently Lisbeth receives care at home. Complications from the hospital stay include none. The patient stated that they do not need help with their daily life and activities. The patient stated that they do have emotional support at home.    Lisbeth is feeling well today with no complaints. No abdominal pain. No issues following the surgery.            I personally reviewed and updated the patient's allergies, medications, problem list,  and past medical, surgical, social, and family history. I have reviewed and confirmed the accuracy of the History of Present Illness and Review of Symptoms as documented by the MA/LPN/RN. Shane Younger MD    Family History   Problem Relation Age of Onset    Stroke Mother 80    Stroke Father 80    Heart attack Sister     Prostate cancer Brother        Social History     Tobacco Use    Smoking status: Never     Passive exposure: Never    Smokeless tobacco: Never   Vaping Use    Vaping status: Never Used   Substance Use Topics    Alcohol use: Never    Drug use: Never       Past Surgical History:   Procedure Laterality Date    CATARACT EXTRACTION, BILATERAL Bilateral 11/01/2022    Dr. Russell    KNEE ARTHROSCOPY Left 01/01/2017    LAPAROSCOPIC CHOLECYSTECTOMY  04/17/2024    [facility] by Dr Rodo Lauren    LAPAROSCOPIC TUBAL LIGATION         Patient Active Problem List   Diagnosis    Medicare annual wellness visit, subsequent    Encounter for screening mammogram for malignant neoplasm of breast    Post menopausal problems    Hyperlipidemia    Gallstone pancreatitis         Current Outpatient Medications:     multivitamin with minerals tablet tablet, Take 1 tablet by mouth Daily. (Patient not taking: Reported on 7/31/2023), Disp: , Rfl:          Review of Systems   Constitutional:  Negative for chills and diaphoresis.   HENT:  Negative for trouble swallowing and voice change.    Eyes:  Negative for visual disturbance.   Respiratory:  Negative for shortness of breath.    Cardiovascular:  Negative for chest pain and palpitations.   Gastrointestinal:  Negative for abdominal pain and nausea.   Endocrine: Negative for polydipsia and polyphagia.   Genitourinary:  Negative for hematuria.   Musculoskeletal:  Negative for neck stiffness.   Skin:  Negative for color change and pallor.   Allergic/Immunologic: Negative for immunocompromised state.   Neurological:  Negative for seizures and syncope.   Hematological:  Negative for  "adenopathy.   Psychiatric/Behavioral:  Negative for hallucinations, sleep disturbance and suicidal ideas.        I have reviewed and confirmed the accuracy of the ROS as documented by the MA/LPN/RN Shane Younger MD      Objective   /72 (BP Location: Right arm, Patient Position: Sitting, Cuff Size: Adult)   Pulse 78   Temp 97.7 °F (36.5 °C) (Temporal)   Resp 16   Ht 157.5 cm (62.01\")   Wt 52.1 kg (114 lb 12.8 oz)   SpO2 94%   BMI 20.99 kg/m²   BP Readings from Last 3 Encounters:   05/01/24 116/72   07/31/23 126/86   02/02/22 128/70     Wt Readings from Last 3 Encounters:   05/01/24 52.1 kg (114 lb 12.8 oz)   07/31/23 52.2 kg (115 lb)   02/02/22 54.3 kg (119 lb 12.8 oz)     Physical Exam  Constitutional:       Appearance: She is well-developed. She is not diaphoretic.   HENT:      Head: Normocephalic.      Right Ear: Tympanic membrane, ear canal and external ear normal.      Left Ear: Tympanic membrane, ear canal and external ear normal.      Nose: Nose normal.   Eyes:      General: Lids are normal.      Conjunctiva/sclera: Conjunctivae normal.      Pupils: Pupils are equal, round, and reactive to light.   Neck:      Thyroid: No thyromegaly.      Vascular: No carotid bruit or JVD.      Trachea: No tracheal deviation.   Cardiovascular:      Rate and Rhythm: Normal rate and regular rhythm.      Heart sounds: Normal heart sounds. No murmur heard.     No friction rub. No gallop.   Pulmonary:      Effort: Pulmonary effort is normal.      Breath sounds: Normal breath sounds. No stridor. No decreased breath sounds, wheezing or rales.   Abdominal:      General: Bowel sounds are normal. There is no distension.      Palpations: Abdomen is soft. There is no mass.      Tenderness: There is no abdominal tenderness. There is no guarding or rebound.      Hernia: No hernia is present.   Lymphadenopathy:      Head:      Right side of head: No submental, submandibular, tonsillar, preauricular, posterior auricular or " "occipital adenopathy.      Left side of head: No submental, submandibular, tonsillar, preauricular, posterior auricular or occipital adenopathy.      Cervical: No cervical adenopathy.   Skin:     General: Skin is warm and dry.      Coloration: Skin is not pale.   Neurological:      Mental Status: She is alert and oriented to person, place, and time.      Cranial Nerves: No cranial nerve deficit.      Sensory: No sensory deficit.      Coordination: Coordination normal.      Gait: Gait normal.      Deep Tendon Reflexes: Reflexes are normal and symmetric.         Data / Lab Results:    No results found for: \"HGBA1C\"     Lab Results   Component Value Date     (H) 07/31/2023     No results found for: \"CHOL\"  Lab Results   Component Value Date    TRIG 107 07/31/2023     Lab Results   Component Value Date    HDL 87 07/31/2023     No results found for: \"PSA\"  Lab Results   Component Value Date    WBC 5.4 05/01/2024    HGB 12.3 05/01/2024    HCT 38.2 05/01/2024    MCV 92 05/01/2024     (H) 05/01/2024     Lab Results   Component Value Date    TSH 7.650 (H) 07/31/2023      Lab Results   Component Value Date    GLUCOSE 153 (H) 05/01/2024    BUN 9 05/01/2024    CREATININE 0.85 05/01/2024    BCR 11 (L) 05/01/2024    K 3.9 05/01/2024    CO2 25 05/01/2024    CALCIUM 9.2 05/01/2024    PROTENTOTREF 6.9 05/01/2024    ALBUMIN 4.2 05/01/2024    LABIL2 1.6 05/01/2024    AST 16 05/01/2024    ALT 10 05/01/2024     No results found for: \"LIZANDRO\", \"RF\", \"SEDRATE\"   No results found for: \"CRP\"   No results found for: \"IRON\", \"TIBC\", \"FERRITIN\"   No results found for: \"XURDRILZ07\"       Assessment & Plan      Medications        Problem List         LOS    Mixed hyperlipidemia.  History of per her report.  Check fasting labs.  Discussed diet, exercise, lifestyle modification.  Follow-up recheck.  Consider Rx if persistent elevation  Medicare wellness.  Overall doing well.  Recommend update pneumonia, tetanus vaccine she declines.  " Discussed calcium vitamin D, coated baby aspirin daily.  Check screening blood work.  Discussed health maintenance, screening test, lifestyle modification.  DEXA scan declined.  Breast cancer screening.  Recommend mammogram she declines.  Family history CVA.  Both parents.  Start coated baby aspirin daily.  Recommend carotid Dopplers times.  Colon cancer screening.  Colonoscopy benign 2024.  Gallstone pancreatitis.  Clinically improved today status post cholecystectomy per Dr. Lauren, recovering well, advancing diet/activity.  With lipase to 1100 improved to 700 on discharge, mildly elevated white blood cell count, recheck blood work.  Expected course discussed.  Call return if fever or worsening symptoms.      Diagnoses and all orders for this visit:    1. Hospital discharge follow-up (Primary)    2. Other acute pancreatitis, unspecified complication status  -     CBC & Differential  -     Comprehensive Metabolic Panel  -     Lipase    3. Gallstone pancreatitis              Expected course, medications, and adverse effects discussed.  Call or return if worsening or persistent symptoms.  I wore protective equipment throughout this patient encounter including a mask, gloves, and eye protection.  Hand hygiene was performed before donning protective equipment and after removal when leaving the room. The complete contents of the Assessment and Plan and Data/Lab Results as documented above have been reviewed and addressed by myself with the patient today as part of an ongoing evaluation / treatment plan.  If some of the documentation has been copied from a previous note and is unchanged it indicates that this problem / plan has been assessed today but is stable from a previous visit and no changes have been recommended.

## 2024-05-01 ENCOUNTER — OFFICE VISIT (OUTPATIENT)
Dept: FAMILY MEDICINE CLINIC | Facility: CLINIC | Age: 74
End: 2024-05-01
Payer: MEDICARE

## 2024-05-01 VITALS
BODY MASS INDEX: 21.12 KG/M2 | OXYGEN SATURATION: 94 % | WEIGHT: 114.8 LBS | RESPIRATION RATE: 16 BRPM | HEART RATE: 78 BPM | TEMPERATURE: 97.7 F | SYSTOLIC BLOOD PRESSURE: 116 MMHG | HEIGHT: 62 IN | DIASTOLIC BLOOD PRESSURE: 72 MMHG

## 2024-05-01 DIAGNOSIS — K85.10 GALLSTONE PANCREATITIS: ICD-10-CM

## 2024-05-01 DIAGNOSIS — Z09 HOSPITAL DISCHARGE FOLLOW-UP: Primary | ICD-10-CM

## 2024-05-01 DIAGNOSIS — K85.80 OTHER ACUTE PANCREATITIS, UNSPECIFIED COMPLICATION STATUS: ICD-10-CM

## 2024-05-01 PROCEDURE — 1111F DSCHRG MED/CURRENT MED MERGE: CPT | Performed by: FAMILY MEDICINE

## 2024-05-01 PROCEDURE — 1126F AMNT PAIN NOTED NONE PRSNT: CPT | Performed by: FAMILY MEDICINE

## 2024-05-01 PROCEDURE — 99495 TRANSJ CARE MGMT MOD F2F 14D: CPT | Performed by: FAMILY MEDICINE

## 2024-05-02 LAB
ALBUMIN SERPL-MCNC: 4.2 G/DL (ref 3.8–4.8)
ALBUMIN/GLOB SERPL: 1.6 {RATIO} (ref 1.2–2.2)
ALP SERPL-CCNC: 100 IU/L (ref 44–121)
ALT SERPL-CCNC: 10 IU/L (ref 0–32)
AST SERPL-CCNC: 16 IU/L (ref 0–40)
BASOPHILS # BLD AUTO: 0 X10E3/UL (ref 0–0.2)
BASOPHILS NFR BLD AUTO: 1 %
BILIRUB SERPL-MCNC: 0.4 MG/DL (ref 0–1.2)
BUN SERPL-MCNC: 9 MG/DL (ref 8–27)
BUN/CREAT SERPL: 11 (ref 12–28)
CALCIUM SERPL-MCNC: 9.2 MG/DL (ref 8.7–10.3)
CHLORIDE SERPL-SCNC: 103 MMOL/L (ref 96–106)
CO2 SERPL-SCNC: 25 MMOL/L (ref 20–29)
CREAT SERPL-MCNC: 0.85 MG/DL (ref 0.57–1)
EGFRCR SERPLBLD CKD-EPI 2021: 72 ML/MIN/1.73
EOSINOPHIL # BLD AUTO: 0.1 X10E3/UL (ref 0–0.4)
EOSINOPHIL NFR BLD AUTO: 2 %
ERYTHROCYTE [DISTWIDTH] IN BLOOD BY AUTOMATED COUNT: 13.5 % (ref 11.7–15.4)
GLOBULIN SER CALC-MCNC: 2.7 G/DL (ref 1.5–4.5)
GLUCOSE SERPL-MCNC: 153 MG/DL (ref 70–99)
HCT VFR BLD AUTO: 38.2 % (ref 34–46.6)
HGB BLD-MCNC: 12.3 G/DL (ref 11.1–15.9)
IMM GRANULOCYTES # BLD AUTO: 0 X10E3/UL (ref 0–0.1)
IMM GRANULOCYTES NFR BLD AUTO: 0 %
LIPASE SERPL-CCNC: 80 U/L (ref 14–85)
LYMPHOCYTES # BLD AUTO: 2.1 X10E3/UL (ref 0.7–3.1)
LYMPHOCYTES NFR BLD AUTO: 40 %
MCH RBC QN AUTO: 29.7 PG (ref 26.6–33)
MCHC RBC AUTO-ENTMCNC: 32.2 G/DL (ref 31.5–35.7)
MCV RBC AUTO: 92 FL (ref 79–97)
MONOCYTES # BLD AUTO: 0.4 X10E3/UL (ref 0.1–0.9)
MONOCYTES NFR BLD AUTO: 7 %
NEUTROPHILS # BLD AUTO: 2.7 X10E3/UL (ref 1.4–7)
NEUTROPHILS NFR BLD AUTO: 50 %
PLATELET # BLD AUTO: 462 X10E3/UL (ref 150–450)
POTASSIUM SERPL-SCNC: 3.9 MMOL/L (ref 3.5–5.2)
PROT SERPL-MCNC: 6.9 G/DL (ref 6–8.5)
RBC # BLD AUTO: 4.14 X10E6/UL (ref 3.77–5.28)
SODIUM SERPL-SCNC: 143 MMOL/L (ref 134–144)
WBC # BLD AUTO: 5.4 X10E3/UL (ref 3.4–10.8)

## 2024-05-07 ENCOUNTER — TELEPHONE (OUTPATIENT)
Dept: FAMILY MEDICINE CLINIC | Facility: CLINIC | Age: 74
End: 2024-05-07
Payer: MEDICARE

## 2024-05-15 PROBLEM — K85.10 GALLSTONE PANCREATITIS: Status: ACTIVE | Noted: 2024-05-15

## 2024-08-19 NOTE — PROGRESS NOTES
Subjective   Lisbeth Villegas is a 74 y.o. female.     Chief Complaint   Patient presents with    Medicare Wellness-subsequent    Hyperlipidemia       The patient is here: to discuss health maintenance and disease prevention to follow up on multiple medical problems.  Last comprehensive physical was on 7/31/2023.  Previous physical was performed by  Shane Younger MD  Overall has: moderate activity with work/home activities, exercises 2 - 3 times per week, good appetite, feels well with no complaints, good energy level, and is sleeping poorly. Nutrition: appropriate balanced diet and eating a variety of foods. Last tetanus shot was unknown. Patient is doing routine self breast exams: occasionally    Previous Exams:  Last PAP Smear was on 2/2/22 by Dr Torres.               · Negative               · Recommended repeat in 3-5 years     Last Mammogram was on 2/09/22 ordered by Dr Torres.               · No mammographic signs of malignancy. Recommend routine mammographic  screening.              · Recommended repeat in 1 year        Last Colonoscopy was on 3/1/24 by Dr Younger.               · Impression of normal colonic mucosa to the cecum, moderated pancolonic diverticulosis, some schlerosed internal hemorrhoids, some small erosions were found at the 100cm libia (scant fragment of benign colonic mucosa with mild lamina propria hemorrhage and congestion otherwise no diagnostic abnormality recognized)and 60cm libia( Scant superficiala fragments of unremarkable colonic epithelium)                 Last EKG was on 5/22/19 ordered by Dr Younger.               · Impression of sinus rhythm- normal         History of Present Illness     Recent Hospitalizations:  Recently treated at the following:  Other: [facility]  [facility]    Patient Care Team:  Shane Younger MD as PCP - General (Family Medicine)     I personally reviewed and updated the patient's allergies, medications, problem list, and past medical, surgical, social, and  "family history. I have reviewed and confirmed the accuracy of the HPI and ROS as documented by the MA/LPN/RN Eliana Spaulding MA      Family History   Problem Relation Age of Onset    Stroke Mother 80    Stroke Father 80    Heart attack Sister     Prostate cancer Brother        Social History     Tobacco Use    Smoking status: Never     Passive exposure: Never    Smokeless tobacco: Never   Vaping Use    Vaping status: Never Used   Substance Use Topics    Alcohol use: Never    Drug use: Never       Past Surgical History:   Procedure Laterality Date    CATARACT EXTRACTION, BILATERAL Bilateral 11/01/2022    Dr. Russell    KNEE ARTHROSCOPY Left 01/01/2017    LAPAROSCOPIC CHOLECYSTECTOMY  04/17/2024    [facility] by Dr Rodo Lauren    LAPAROSCOPIC TUBAL LIGATION         Patient Active Problem List   Diagnosis    Medicare annual wellness visit, subsequent    Encounter for screening mammogram for malignant neoplasm of breast    Post menopausal problems    Hyperlipidemia    Gallstone pancreatitis         Current Outpatient Medications:     multivitamin with minerals tablet tablet, Take 1 tablet by mouth Daily., Disp: , Rfl:     No opioid medication identified on active medication list. I have reviewed chart for other potential  high risk medication/s and harmful drug interactions in the elderly.          Objective   /76 (BP Location: Right arm, Patient Position: Sitting, Cuff Size: Adult)   Pulse 89   Temp 98.4 °F (36.9 °C) (Temporal)   Resp 16   Ht 157.5 cm (62.01\")   Wt 52.2 kg (115 lb)   SpO2 97%   BMI 21.03 kg/m²   BP Readings from Last 3 Encounters:   08/21/24 116/76   05/01/24 116/72   07/31/23 126/86     Wt Readings from Last 3 Encounters:   08/21/24 52.2 kg (115 lb)   05/01/24 52.1 kg (114 lb 12.8 oz)   07/31/23 52.2 kg (115 lb)       No results found.     Age-appropriate Screening Schedule:  Refer to the list below for future screening recommendations based on patient's age, sex and/or medical conditions. " Orders for these [facility] tests are listed in the plan section. The patient has been provided with a written plan.    Health Maintenance   Topic Date Due    DXA SCAN  Never done    TDAP/TD VACCINES (1 - Tdap) Never done    ZOSTER VACCINE (1 of 2) Never done    Pneumococcal Vaccine 65+ (1 of 1 - PCV) Never done    COVID-19 Vaccine (1 - - season) Never done    MAMMOGRAM  2024    INFLUENZA VACCINE  2024    HEPATITIS C SCREENING  2025 (Originally 2022)    LIPID PANEL  2025    ANNUAL WELLNESS VISIT  2025    COLORECTAL CANCER SCREENING  2034       Fall Risk Screen:    SHIRIN Fall Risk Assessment was completed, and patient is at LOW risk for falls.Assessment completed on:2024    Depression Screen:       2024     1:08 PM   PHQ-2/PHQ-9 Depression Screening   Little Interest or Pleasure in Doing Things 0-->not at all   Feeling Down, Depressed or Hopeless 0-->not at all   PHQ-9: Brief Depression Severity Measure Score 0     I spent more than 16 minutes asking patient questions, counseling and documenting in the chart.    Health Habits and Functional and Cognitive Screenin/21/2024     1:00 PM   Functional & Cognitive Status   Do you have difficulty preparing food and eating? No   Do you have difficulty bathing yourself, getting dressed or grooming yourself? No   Do you have difficulty using the toilet? No   Do you have difficulty moving around from place to place? No   Do you have trouble with steps or getting out of a bed or a chair? No   Current Diet Well Balanced Diet   Dental Exam Not up to date   Eye Exam Not up to date   Exercise (times per week) 4 times per week   Current Exercises Include Yard Work;Walking;House Cleaning   Do you need help using the phone?  No   Are you deaf or do you have serious difficulty hearing?  No   Do you need help to go to places out of walking distance? No   Do you need help shopping? No   Do you need help preparing meals?   No   Do you need help with housework?  No   Do you need help with laundry? No   Do you need help taking your medications? No   Do you need help managing money? No   Do you ever drive or ride in a car without wearing a seat belt? No   Have you felt unusual stress, anger or loneliness in the last month? No   Who do you live with? Spouse   If you need help, do you have trouble finding someone available to you? No   Have you been bothered in the last four weeks by sexual problems? No   Do you have difficulty concentrating, remembering or making decisions? No       Does the patient have evidence of cognitive impairment? No    Advance Care Planning:  ACP discussion was held with the patient during this visit. Patient does not have an advance directive, declines further assistance.     A face-to-face visit was completed today with patient.  Counseling explanation, and discussion of advanced directives was performed.   The last advanced care visit was performed in 2023.  In a near life ending situation, from which she is not expected to recover functionally, and she is not able to speak for her, she does not want life sustaining measures. We discussed feeding tubes, ventilators and cardiac support as well as dialysis.    I spent more than 16 minutes discussing Advanced Care Planning information and documenting in the chart.    Identification of Risk Factors:  Risk factors include: Advance Directive Discussion  Breast Cancer/Mammogram Screening  Colon Cancer Screening  Dementia/Memory   Fall Risk  Immunizations Discussed/Encouraged (specific immunizations; Tdap, Influenza, Prevnar 20 (Pneumococcal 20-valent conjugate), Shingrix, COVID19, and RSV (Respiratory Syncytial Virus) )  Osteoporosis Risk  Urinary Incontinence  Dental Screening Recommended; [facility] DENTAL SCREENING RECOMMENDED:  Vision Screening Recommended; [facility] VISION SCREENING    Compared to one year ago, the patient feels her physical health is the  same.  Compared to one year ago, the patient feels her mental health is the same.    Patient Self-Management and Personalized Health Advice  The patient has been provided with information about: diet, exercise, weight management, fall prevention, designing advance directives, supplements, and mental health concerns and preventive services including:   Alcohol Misuse Screening and Counseling  (15 minutes counseling time, Code )  Annual Wellness Visit (AWV)  Bone Density Measurements  Cardiovascular Disease Screening Tests (may do this order every 5 years in beneficiaries without signs or symptoms of cardiovascular disease)  Colorectal Cancer Screening, Colonoscopy  Colorectal Cancer Screening, Cologuard Test   Counseling to Prevent Tobacco Use (use of smartset and @cessation@ smartphrase for documentation)  Depression Screening (15 minutes face to face, Code )  Diabetes Screening-Lab Order for either glucose quantitative blood (except reagent strip), glucose;post glucose dose(includes glucose), or glucose tolerance test-3 specimens(includes glucose).      Assessment & Plan      Medications        Problem List         LOS      Diagnoses and all orders for this visit:    1. Medicare annual wellness visit, subsequent (Primary)    2. Hyperlipidemia, unspecified hyperlipidemia type    3. Encounter for screening mammogram for malignant neoplasm of breast        Medicare wellness.  Discussed health maintenance, routine immunizations, screening tests, lifestyle modification.

## 2024-08-19 NOTE — PROGRESS NOTES
Subjective   Lisbeth Villegas is a 74 y.o. female.     Chief Complaint   Patient presents with    Medicare Wellness-subsequent    Hyperlipidemia       Hyperlipidemia  This is a chronic problem. The current episode started more than 1 year ago. Pertinent negatives include no chest pain, leg pain or shortness of breath. Current antihyperlipidemic treatment includes diet change and exercise. Risk factors for coronary artery disease include dyslipidemia.            I personally reviewed and updated the patient's allergies, medications, problem list, and past medical, surgical, social, and family history. I have reviewed and confirmed the accuracy of the History of Present Illness and Review of Symptoms as documented by the MA/LPN/RN. Shane Younger MD    Family History   Problem Relation Age of Onset    Stroke Mother 80    Stroke Father 80    Heart attack Sister     Prostate cancer Brother        Social History     Tobacco Use    Smoking status: Never     Passive exposure: Never    Smokeless tobacco: Never   Vaping Use    Vaping status: Never Used   Substance Use Topics    Alcohol use: Never    Drug use: Never       Past Surgical History:   Procedure Laterality Date    CATARACT EXTRACTION, BILATERAL Bilateral 11/01/2022    Dr. Russell    KNEE ARTHROSCOPY Left 01/01/2017    LAPAROSCOPIC CHOLECYSTECTOMY  04/17/2024    [facility] by Dr Rodo Lauren    LAPAROSCOPIC TUBAL LIGATION         Patient Active Problem List   Diagnosis    Medicare annual wellness visit, subsequent    Encounter for screening mammogram for malignant neoplasm of breast    Post menopausal problems    Hyperlipidemia    Gallstone pancreatitis         Current Outpatient Medications:     multivitamin with minerals tablet tablet, Take 1 tablet by mouth Daily., Disp: , Rfl:          Review of Systems   Constitutional:  Negative for chills and diaphoresis.   HENT:  Negative for trouble swallowing and voice change.    Eyes:  Negative for visual disturbance.  "  Respiratory:  Negative for shortness of breath.    Cardiovascular:  Negative for chest pain and palpitations.   Gastrointestinal:  Negative for abdominal pain and nausea.   Endocrine: Negative for polydipsia and polyphagia.   Genitourinary:  Negative for hematuria.   Musculoskeletal:  Negative for neck stiffness.   Skin:  Negative for color change and pallor.   Allergic/Immunologic: Negative for immunocompromised state.   Neurological:  Negative for seizures and syncope.   Hematological:  Negative for adenopathy.   Psychiatric/Behavioral:  Negative for hallucinations, sleep disturbance and suicidal ideas.        I have reviewed and confirmed the accuracy of the ROS as documented by the MA/LPN/RN Shane Younger MD      Objective   /76 (BP Location: Right arm, Patient Position: Sitting, Cuff Size: Adult)   Pulse 89   Temp 98.4 °F (36.9 °C) (Temporal)   Resp 16   Ht 157.5 cm (62.01\")   Wt 52.2 kg (115 lb)   SpO2 97%   BMI 21.03 kg/m²   BP Readings from Last 3 Encounters:   08/21/24 116/76   05/01/24 116/72   07/31/23 126/86     Wt Readings from Last 3 Encounters:   08/21/24 52.2 kg (115 lb)   05/01/24 52.1 kg (114 lb 12.8 oz)   07/31/23 52.2 kg (115 lb)     Physical Exam  Constitutional:       Appearance: She is well-developed. She is not diaphoretic.   HENT:      Head: Normocephalic.      Right Ear: Tympanic membrane, ear canal and external ear normal.      Left Ear: Tympanic membrane, ear canal and external ear normal.      Nose: Nose normal.   Eyes:      General: Lids are normal.      Conjunctiva/sclera: Conjunctivae normal.      Pupils: Pupils are equal, round, and reactive to light.   Neck:      Thyroid: No thyromegaly.      Vascular: No carotid bruit or JVD.      Trachea: No tracheal deviation.   Cardiovascular:      Rate and Rhythm: Normal rate and regular rhythm.      Heart sounds: Normal heart sounds. No murmur heard.     No friction rub. No gallop.   Pulmonary:      Effort: Pulmonary effort is " "normal.      Breath sounds: Normal breath sounds. No stridor. No decreased breath sounds, wheezing or rales.   Abdominal:      General: Bowel sounds are normal. There is no distension.      Palpations: Abdomen is soft. There is no mass.      Tenderness: There is no abdominal tenderness. There is no guarding or rebound.      Hernia: No hernia is present.   Lymphadenopathy:      Head:      Right side of head: No submental, submandibular, tonsillar, preauricular, posterior auricular or occipital adenopathy.      Left side of head: No submental, submandibular, tonsillar, preauricular, posterior auricular or occipital adenopathy.      Cervical: No cervical adenopathy.   Skin:     General: Skin is warm and dry.      Coloration: Skin is not pale.   Neurological:      Mental Status: She is alert and oriented to person, place, and time.      Cranial Nerves: No cranial nerve deficit.      Sensory: No sensory deficit.      Coordination: Coordination normal.      Gait: Gait normal.      Deep Tendon Reflexes: Reflexes are normal and symmetric.         Data / Lab Results:    No results found for: \"HGBA1C\"     Lab Results   Component Value Date     (H) 07/31/2023     No results found for: \"CHOL\"  Lab Results   Component Value Date    TRIG 107 07/31/2023     Lab Results   Component Value Date    HDL 87 07/31/2023     No results found for: \"PSA\"  Lab Results   Component Value Date    WBC 5.4 05/01/2024    HGB 12.3 05/01/2024    HCT 38.2 05/01/2024    MCV 92 05/01/2024     (H) 05/01/2024     Lab Results   Component Value Date    TSH 7.650 (H) 07/31/2023      Lab Results   Component Value Date    GLUCOSE 153 (H) 05/01/2024    BUN 9 05/01/2024    CREATININE 0.85 05/01/2024    BCR 11 (L) 05/01/2024    K 3.9 05/01/2024    CO2 25 05/01/2024    CALCIUM 9.2 05/01/2024    PROTENTOTREF 6.9 05/01/2024    ALBUMIN 4.2 05/01/2024    LABIL2 1.6 05/01/2024    AST 16 05/01/2024    ALT 10 05/01/2024     No results found for: \"LIZANDRO\", " "\"RF\", \"SEDRATE\"   No results found for: \"CRP\"   No results found for: \"IRON\", \"TIBC\", \"FERRITIN\"   No results found for: \"YOFZQMHH41\"       Assessment & Plan      Medications        Problem List         LOS    Medicare wellness.  Overall doing well.  Recommend update pneumonia, tetanus vaccine she declines.  Discussed calcium vitamin D, coated baby aspirin daily.  Check screening blood work.  Discussed health maintenance, screening test, lifestyle modification.  DEXA scan declined.  Hyperlipidemia.  Moderate elevation .  Discussed diet, exercise and lifestyle modification.  Recheck fasting labs.  Consider add red yeast rice if persistent elevation.  Breast cancer screening.  Recommend mammogram she agrees this year.  Family history CVA.  Both parents.  Start coated baby aspirin daily.  Recommend carotid Dopplers she declines.  Colon cancer screening.  Colonoscopy benign 2024.  Gallstone pancreatitis.  Clinically improved today status post cholecystectomy per Dr. Lauren, recovering well, advancing diet/activity.  With lipase to 1100 improved to 700 on discharge, mildly elevated white blood cell count, recheck blood work.  Expected course discussed.  Call return if fever or worsening symptoms.    Previous Exams:  Last PAP Smear was on 2/2/22 by Dr Torres.    · Negative    · Recommended repeat in 3-5 years    Last Mammogram was on 2/09/22 ordered by Dr Torres.    · No mammographic signs of malignancy. Recommend routine mammographic  screening.   · Recommended repeat in 1 year      Last Colonoscopy was on 3/1/24 by Dr Younger.    · Impression of normal colonic mucosa to the cecum, moderated pancolonic diverticulosis, some schlerosed internal hemorrhoids, some small erosions were found at the 100cm libia (scant fragment of benign colonic mucosa with mild lamina propria hemorrhage and congestion otherwise no diagnostic abnormality recognized)and 60cm libia( Scant superficiala fragments of unremarkable " colonic epithelium)      Last EKG was on 5/22/19 ordered by Dr Younger.    · Impression of sinus rhythm- normal       Diagnoses and all orders for this visit:    1. Medicare annual wellness visit, subsequent (Primary)    2. Hyperlipidemia, unspecified hyperlipidemia type    3. Encounter for screening mammogram for malignant neoplasm of breast                Expected course, medications, and adverse effects discussed.  Call or return if worsening or persistent symptoms.  I wore protective equipment throughout this patient encounter including a mask, gloves, and eye protection.  Hand hygiene was performed before donning protective equipment and after removal when leaving the room. The complete contents of the Assessment and Plan and Data/Lab Results as documented above have been reviewed and addressed by myself with the patient today as part of an ongoing evaluation / treatment plan.  If some of the documentation has been copied from a previous note and is unchanged it indicates that this problem / plan has been assessed today but is stable from a previous visit and no changes have been recommended.

## 2024-08-21 ENCOUNTER — OFFICE VISIT (OUTPATIENT)
Dept: FAMILY MEDICINE CLINIC | Facility: CLINIC | Age: 74
End: 2024-08-21
Payer: MEDICARE

## 2024-08-21 VITALS
TEMPERATURE: 98.4 F | DIASTOLIC BLOOD PRESSURE: 76 MMHG | OXYGEN SATURATION: 97 % | BODY MASS INDEX: 21.16 KG/M2 | SYSTOLIC BLOOD PRESSURE: 116 MMHG | HEIGHT: 62 IN | RESPIRATION RATE: 16 BRPM | WEIGHT: 115 LBS | HEART RATE: 89 BPM

## 2024-08-21 DIAGNOSIS — R53.83 MALAISE AND FATIGUE: ICD-10-CM

## 2024-08-21 DIAGNOSIS — R53.81 MALAISE AND FATIGUE: ICD-10-CM

## 2024-08-21 DIAGNOSIS — Z12.31 ENCOUNTER FOR SCREENING MAMMOGRAM FOR MALIGNANT NEOPLASM OF BREAST: ICD-10-CM

## 2024-08-21 DIAGNOSIS — E78.5 HYPERLIPIDEMIA, UNSPECIFIED HYPERLIPIDEMIA TYPE: ICD-10-CM

## 2024-08-21 DIAGNOSIS — Z12.31 SCREENING MAMMOGRAM FOR BREAST CANCER: ICD-10-CM

## 2024-08-21 DIAGNOSIS — Z00.00 MEDICARE ANNUAL WELLNESS VISIT, SUBSEQUENT: Primary | ICD-10-CM

## 2024-09-11 ENCOUNTER — HOSPITAL ENCOUNTER (OUTPATIENT)
Dept: MAMMOGRAPHY | Facility: HOSPITAL | Age: 74
Discharge: HOME | End: 2024-09-11
Admitting: FAMILY MEDICINE
Payer: MEDICARE

## 2024-09-11 ENCOUNTER — TELEPHONE (OUTPATIENT)
Dept: FAMILY MEDICINE CLINIC | Facility: CLINIC | Age: 74
End: 2024-09-11
Payer: MEDICARE

## 2024-09-11 DIAGNOSIS — Z12.31 SCREENING MAMMOGRAM FOR BREAST CANCER: ICD-10-CM

## 2024-09-11 PROCEDURE — 77063 BREAST TOMOSYNTHESIS BI: CPT

## 2024-09-11 PROCEDURE — 77067 SCR MAMMO BI INCL CAD: CPT

## 2024-09-18 DIAGNOSIS — E03.9 ACQUIRED HYPOTHYROIDISM: Primary | ICD-10-CM

## 2024-10-08 ENCOUNTER — TELEPHONE (OUTPATIENT)
Dept: FAMILY MEDICINE CLINIC | Facility: CLINIC | Age: 74
End: 2024-10-08
Payer: MEDICARE

## 2024-10-08 NOTE — TELEPHONE ENCOUNTER
"Patient returned call about these labs:  \"Let her know her blood work shows her blood count, kidney function looks good, her cholesterol is improved but she still has a moderate elevation, her LDL is 144, the goal is less than 100, want her to keep working on diet and exercise, lets add red yeast rice over-the-counter 2400 mg daily to help bring her cholesterol down, also her TSH for her thyroid is worse this year, it is close to a level where we can can consider starting her on medicine for low thyroid, want to repeat some labs in a few months because sometimes the TSH can fluctuate and we will do some more thorough thyroid labs at that time, set her up for a follow-up visit in 3 months, with a TSH, free T3, free T4, antithyroid peroxidase antibodies ahead, thanks \"    She could not find the red yeast rice. Patient was looking for 400mg. She thought the message said 400mg twice daily.  I let her know it is 2400mg daily.     Patient will get this. I also helped schedule her follow up for 1st week of January   "

## 2025-01-08 ENCOUNTER — OFFICE VISIT (OUTPATIENT)
Dept: FAMILY MEDICINE CLINIC | Facility: CLINIC | Age: 75
End: 2025-01-08
Payer: MEDICARE

## 2025-01-08 VITALS
BODY MASS INDEX: 21.9 KG/M2 | TEMPERATURE: 98 F | OXYGEN SATURATION: 98 % | WEIGHT: 119 LBS | RESPIRATION RATE: 18 BRPM | SYSTOLIC BLOOD PRESSURE: 122 MMHG | HEIGHT: 62 IN | HEART RATE: 76 BPM | DIASTOLIC BLOOD PRESSURE: 76 MMHG

## 2025-01-08 DIAGNOSIS — E78.5 HYPERLIPIDEMIA, UNSPECIFIED HYPERLIPIDEMIA TYPE: ICD-10-CM

## 2025-01-08 DIAGNOSIS — E03.8 SUBCLINICAL HYPOTHYROIDISM: ICD-10-CM

## 2025-01-08 DIAGNOSIS — E03.9 ACQUIRED HYPOTHYROIDISM: Primary | ICD-10-CM

## 2025-01-08 PROCEDURE — G2211 COMPLEX E/M VISIT ADD ON: HCPCS | Performed by: FAMILY MEDICINE

## 2025-01-08 PROCEDURE — 1126F AMNT PAIN NOTED NONE PRSNT: CPT | Performed by: FAMILY MEDICINE

## 2025-01-08 PROCEDURE — 99213 OFFICE O/P EST LOW 20 MIN: CPT | Performed by: FAMILY MEDICINE

## 2025-01-08 RX ORDER — UBIDECARENONE 50 MG
2400 CAPSULE ORAL DAILY
COMMUNITY

## 2025-01-08 NOTE — PROGRESS NOTES
Subjective   Lisbeth Villegas is a 74 y.o. female.     Chief Complaint   Patient presents with    Thyroid Problem     Repeat blood work    Hyperlipidemia       History of Present Illness  Influenza immunization was not given due to patient refusal  Pneumococcal Vaccine was not given due to patient refusal    Hyperlipidemia  This is a chronic problem. The current episode started more than 1 year ago. Pertinent negatives include no chest pain, leg pain or shortness of breath. Current antihyperlipidemic treatment includes diet change and exercise. Risk factors for coronary artery disease include dyslipidemia.   Thyroid Problem  Presents for follow-up visit. Symptoms include cold intolerance. Patient reports no anxiety, diaphoresis, dry skin, fatigue, hair loss, heat intolerance, palpitations or visual change. Past treatments include nothing. Her past medical history is significant for hyperlipidemia.            I personally reviewed and updated the patient's allergies, medications, problem list, and past medical, surgical, social, and family history. I have reviewed and confirmed the accuracy of the History of Present Illness and Review of Symptoms as documented by the MA/YAMILEN/RN. Shane Younger MD    Family History   Problem Relation Age of Onset    Stroke Mother 80    Stroke Father 80    Heart attack Sister     Prostate cancer Brother     Breast cancer Neg Hx     Ovarian cancer Neg Hx        Social History     Tobacco Use    Smoking status: Never     Passive exposure: Never    Smokeless tobacco: Never   Vaping Use    Vaping status: Never Used   Substance Use Topics    Alcohol use: Never    Drug use: Never       Past Surgical History:   Procedure Laterality Date    CATARACT EXTRACTION, BILATERAL Bilateral 11/01/2022    Dr. Russell    KNEE ARTHROSCOPY Left 01/01/2017    LAPAROSCOPIC CHOLECYSTECTOMY  04/17/2024    [facility] by Dr Rodo Lauren    LAPAROSCOPIC TUBAL LIGATION         Patient Active Problem List   Diagnosis     "Medicare annual wellness visit, subsequent    Encounter for screening mammogram for malignant neoplasm of breast    Post menopausal problems    Hyperlipidemia    Gallstone pancreatitis    Subclinical hypothyroidism         Current Outpatient Medications:     multivitamin with minerals tablet tablet, Take 1 tablet by mouth Daily., Disp: , Rfl:     Red Yeast Rice 600 MG tablet, Take 2,400 mg by mouth Daily., Disp: , Rfl:          Review of Systems   Constitutional:  Negative for chills, diaphoresis and fatigue.   Eyes:  Negative for visual disturbance.   Respiratory:  Negative for shortness of breath.    Cardiovascular:  Negative for chest pain and palpitations.   Gastrointestinal:  Negative for abdominal pain and nausea.   Endocrine: Positive for cold intolerance. Negative for heat intolerance, polydipsia and polyphagia.   Musculoskeletal:  Negative for neck stiffness.   Skin:  Negative for color change, dry skin and pallor.   Neurological:  Negative for dizziness, seizures, syncope and headache.   Hematological:  Negative for adenopathy.   Psychiatric/Behavioral:  Negative for hallucinations and suicidal ideas. The patient is not nervous/anxious.        I have reviewed and confirmed the accuracy of the ROS as documented by the MA/LPN/RN Shane Younger MD      Objective   /76 (BP Location: Right arm, Patient Position: Sitting, Cuff Size: Adult)   Pulse 76   Temp 98 °F (36.7 °C) (Temporal)   Resp 18   Ht 157.5 cm (62.01\")   Wt 54 kg (119 lb)   SpO2 98%   BMI 21.76 kg/m²   BP Readings from Last 3 Encounters:   01/08/25 122/76   08/21/24 116/76   05/01/24 116/72     Wt Readings from Last 3 Encounters:   01/08/25 54 kg (119 lb)   08/21/24 52.2 kg (115 lb)   05/01/24 52.1 kg (114 lb 12.8 oz)     Physical Exam  HENT:      Right Ear: Hearing, tympanic membrane, ear canal and external ear normal.      Left Ear: Hearing, tympanic membrane, ear canal and external ear normal.      Nose: Nose normal. No mucosal " "edema or congestion.      Right Sinus: No maxillary sinus tenderness or frontal sinus tenderness.      Left Sinus: No maxillary sinus tenderness or frontal sinus tenderness.      Mouth/Throat:      Mouth: No oral lesions.      Pharynx: Uvula midline. No oropharyngeal exudate or posterior oropharyngeal erythema.      Tonsils: No tonsillar exudate.         Data / Lab Results:    No results found for: \"HGBA1C\"  Lab Results   Component Value Date    Glucose 90 08/23/2024    Glucose 71 04/17/2024    Glucose, UA Negative 02/02/2022     Lab Results   Component Value Date     (H) 08/23/2024     (H) 07/31/2023     No results found for: \"CHOL\"  Lab Results   Component Value Date    TRIG 79 08/23/2024    TRIG 107 07/31/2023     Lab Results   Component Value Date    HDL 71 08/23/2024    HDL 87 07/31/2023     No results found for: \"PSA\"  Lab Results   Component Value Date    WBC 5.7 08/23/2024    HGB 13.2 08/23/2024    HCT 41.7 08/23/2024    MCV 95 08/23/2024     08/23/2024     Lab Results   Component Value Date    TSH 8.650 (H) 12/27/2024    THYROIDAB 298 (H) 12/27/2024      Lab Results   Component Value Date    GLUCOSE 90 08/23/2024    BUN 13 08/23/2024    CREATININE 0.86 08/23/2024    BCR 15 08/23/2024    K 4.7 08/23/2024    CO2 26 08/23/2024    CALCIUM 9.4 08/23/2024    PROTENTOTREF 6.9 08/23/2024    ALBUMIN 4.4 08/23/2024    LABIL2 1.6 05/01/2024    AST 21 08/23/2024    ALT 11 08/23/2024     No results found for: \"LIZANDRO\", \"RF\", \"SEDRATE\"   No results found for: \"CRP\"   No results found for: \"IRON\", \"TIBC\", \"FERRITIN\"   No results found for: \"[id number]\"       Assessment & Plan      Medications        Problem List         LOS    Health maintenance.  Overall doing well.  Recommend update pneumonia, tetanus vaccine she declines.  Discussed calcium vitamin D, coated baby aspirin daily.  Check screening blood work.  Discussed health maintenance, screening test, lifestyle modification.  DEXA scan " declined.  Hyperlipidemia.  Moderate elevation .  Discussed diet, exercise and lifestyle modification.  Recheck fasting labs.  Consider add red yeast rice if persistent elevation.  Breast cancer screening.  Recommend mammogram she agrees this year.  Family history CVA.  Both parents.  Start coated baby aspirin daily.  Recommend carotid Dopplers she declines.  Colon cancer screening.  Colonoscopy benign 2024.  Gallstone pancreatitis.  Clinically improved today status post cholecystectomy per Dr. Lauren, recovering well, advancing diet/activity.  With lipase to 1100 improved to 700 on discharge, mildly elevated white blood cell count, recheck blood work.  Expected course discussed.  Call return if fever or worsening symptoms.  Subclinical hypothyroidism.  Clinically improved today, TSH to 8.5, free T4 mildly low.  Thyroid peroxidase antibody positive.  Asymptomatic.  Follow-up recheck labs 6 months.  Plan Rx if worsening symptoms.    Previous Exams:  PAP Smear was on 2/2/22 by Dr Torres.    · Negative    · Recommended repeat in 3-5 years  Mammogram was on 2/09/22 ordered by Dr Torres.    · No mammographic signs of malignancy. Recommend routine mammographic  screening.   · Recommended repeat in 1 year  Colonoscopy was on 3/1/24 by Dr Younger.    · Impression of normal colonic mucosa to the cecum, moderated pancolonic diverticulosis, some schlerosed internal hemorrhoids, some small erosions were found at the 100cm libia (scant fragment of benign colonic mucosa with mild lamina propria hemorrhage and congestion otherwise no diagnostic abnormality recognized)and 60cm libia( Scant superficiala fragments of unremarkable colonic epithelium)   EKG was on 5/22/19 ordered by Dr Younger.    · Impression of sinus rhythm- normal       Diagnoses and all orders for this visit:    1. Acquired hypothyroidism (Primary)    2. Hyperlipidemia, unspecified hyperlipidemia type    3. Subclinical  hypothyroidism              Expected course, medications, and adverse effects discussed.  Call or return if worsening or persistent symptoms.  I wore protective equipment throughout this patient encounter including a mask, gloves, and eye protection.  Hand hygiene was performed before donning protective equipment and after removal when leaving the room. The complete contents of the Assessment and Plan and Data/Lab Results as documented above have been reviewed and addressed by myself with the patient today as part of an ongoing evaluation / treatment plan.  If some of the documentation has been copied from a previous note and is unchanged it indicates that this problem / plan has been assessed today but is stable from a previous visit and no changes have been recommended.

## 2025-01-10 ENCOUNTER — TELEPHONE (OUTPATIENT)
Dept: FAMILY MEDICINE CLINIC | Facility: CLINIC | Age: 75
End: 2025-01-10
Payer: MEDICARE

## 2025-01-10 NOTE — TELEPHONE ENCOUNTER
Spoke with Lisbeth and she was asking about recent labs and cholesterol levels. She was confused stating that She seen Dr Younger on 1/8/24 and they discussed her thyroid levels but not recent cholesterol levels. I explained that her lipid wasn't checked at her recent blood draw and typically is checked once a year. She clarified that she is taking Red Yeast Rice daily and I explained that when we joey her physical labs in August her TSH was abnormal and this is why her thyroid labs were rechecked and reviewed at her recent office visit. She expressed understanding

## 2025-01-10 NOTE — TELEPHONE ENCOUNTER
Caller: Lisbeth Villegas I    Relationship to patient: Self    Best call back number:   Telephone Information:   Mobile [phone]         Patient is needing: PATIENT'S IS WANTING A CALL BACK., NO REASON GIVEN. WOULD LIKE A CALL BACK BEFORE 12 PM AS SHE GOES TO WORK. I ADVISED OF 24-48 HOUR TURN AROUND.